# Patient Record
Sex: MALE | Race: AMERICAN INDIAN OR ALASKA NATIVE
[De-identification: names, ages, dates, MRNs, and addresses within clinical notes are randomized per-mention and may not be internally consistent; named-entity substitution may affect disease eponyms.]

---

## 2017-08-28 ENCOUNTER — HOSPITAL ENCOUNTER (EMERGENCY)
Dept: HOSPITAL 5 - ED | Age: 27
Discharge: HOME | End: 2017-08-28
Payer: SELF-PAY

## 2017-08-28 VITALS — DIASTOLIC BLOOD PRESSURE: 62 MMHG | SYSTOLIC BLOOD PRESSURE: 117 MMHG

## 2017-08-28 DIAGNOSIS — B34.9: Primary | ICD-10-CM

## 2017-08-28 DIAGNOSIS — F17.200: ICD-10-CM

## 2017-08-28 PROCEDURE — 99282 EMERGENCY DEPT VISIT SF MDM: CPT

## 2017-08-28 NOTE — EMERGENCY DEPARTMENT REPORT
ED General Adult HPI





- General


Chief complaint: Upper Respiratory Infection


Stated complaint: FLU SYMPTOMS


Time Seen by Provider: 08/28/17 10:55


Source: patient


Mode of arrival: Ambulatory


Limitations: No Limitations





- History of Present Illness


Initial comments: 





Patient is a 27-year-old male with no significant past medical history who 

presents with cough and myalgias and headaches that have been going on for the 

last 2 days.  Patient states that he feels like he has a viral syndrome.  He 

states that he's had some subjective fever.  He is not tried any over-the-

counter medications.  He states that his body pain is a 3 out of 10 all over 

his body is achy type of pain.  It is intermittent he states that coughing 

makes it worse and that nothing makes symptoms better. Pt states that he has 

been coughing brown sputum. 





- Related Data


 Previous Rx's











 Medication  Instructions  Recorded  Last Taken  Type


 


Guaifen/Phenyleph/Acetaminophn 1 each PO Q8HR PRN #30 tablet 08/28/17 Unknown Rx





[Tylenol Cold Head Congest Cplt]    


 


Ibuprofen [Motrin 400 MG tab] 400 mg PO Q8H PRN #30 tablet 08/28/17 Unknown Rx











 Allergies











Allergy/AdvReac Type Severity Reaction Status Date / Time


 


No Known Allergies Allergy   Unverified 08/28/17 09:37














ED Review of Systems


ROS: 


Stated complaint: FLU SYMPTOMS


Other details as noted in HPI





Constitutional: fever.  denies: chills


Eyes: denies: eye pain, eye discharge, vision change


ENT: denies: ear pain, throat pain


Respiratory: cough.  denies: shortness of breath, wheezing


Cardiovascular: denies: chest pain, palpitations


Endocrine: no symptoms reported


Gastrointestinal: denies: abdominal pain, nausea, diarrhea


Genitourinary: denies: urgency, dysuria


Musculoskeletal: myalgia.  denies: back pain, joint swelling, arthralgia


Skin: denies: rash, lesions


Neurological: denies: headache, weakness, paresthesias


Psychiatric: denies: anxiety, depression


Hematological/Lymphatic: denies: easy bleeding, easy bruising





ED Past Medical Hx





- Past Medical History


Previous Medical History?: No





- Surgical History


Past Surgical History?: No





- Social History


Smoking Status: Current Every Day Smoker


Substance Use Type: Other





- Medications


Home Medications: 


 Home Medications











 Medication  Instructions  Recorded  Confirmed  Last Taken  Type


 


Guaifen/Phenyleph/Acetaminophn 1 each PO Q8HR PRN #30 tablet 08/28/17  Unknown 

Rx





[Tylenol Cold Head Congest Cplt]     


 


Ibuprofen [Motrin 400 MG tab] 400 mg PO Q8H PRN #30 tablet 08/28/17  Unknown Rx














ED Physical Exam





- General


Limitations: No Limitations


General appearance: alert, in no apparent distress





- Head


Head exam: Present: atraumatic, normocephalic





- Eye


Eye exam: Present: normal appearance





- ENT


ENT exam: Present: mucous membranes moist





- Neck


Neck exam: Present: normal inspection





- Respiratory


Respiratory exam: Present: normal lung sounds bilaterally.  Absent: respiratory 

distress





- Cardiovascular


Cardiovascular Exam: Present: regular rate, normal rhythm.  Absent: systolic 

murmur, diastolic murmur, rubs, gallop





- GI/Abdominal


GI/Abdominal exam: Present: soft, normal bowel sounds





- Rectal


Rectal exam: Present: deferred





- Extremities Exam


Extremities exam: Present: normal inspection





- Back Exam


Back exam: Present: normal inspection





- Neurological Exam


Neurological exam: Present: alert, oriented X3





- Psychiatric


Psychiatric exam: Present: normal affect, normal mood





- Skin


Skin exam: Present: warm, dry, intact, normal color.  Absent: rash





ED Course


 Vital Signs











  08/28/17 08/28/17





  09:37 11:38


 


Temperature 98.9 F 


 


Pulse Rate 60 


 


Respiratory 18 18





Rate  


 


Blood Pressure 117/62 


 


O2 Sat by Pulse 97 





Oximetry  














ED Medical Decision Making





- Medical Decision Making





Chief medical diagnosis: Upper respiratory infection secondary to [place] virus


Differential multiple diagnosis: Sinusitis, influenza








I will give patient oral Tylenol and oral acetaminophen








The patient's clinical symptoms having a viral illness I will send patient home 

with rest and have him follow return precautions to come back to the emergency 

department.  Discussed plan with the patient he agrees plan.


Critical care attestation.: 


If time is entered above; I have spent that time in minutes in the direct care 

of this critically ill patient, excluding procedure time.








ED Disposition


Clinical Impression: 


 Viral syndrome





Disposition: DC-01 TO HOME OR SELFCARE


Is pt being admited?: No


Does the pt Need Aspirin: No


Condition: Stable


Prescriptions: 


Guaifen/Phenyleph/Acetaminophn [Tylenol Cold Head Congest Cplt] 1 each PO Q8HR 

PRN #30 tablet


 PRN Reason: Headache


Ibuprofen [Motrin 400 MG tab] 400 mg PO Q8H PRN #30 tablet


 PRN Reason: Pain


Referrals: 


PRIMARY CARE,MD [Primary Care Provider] - 3-5 Days


Forms:  Work/School Release Form(ED)


Time of Disposition: 11:30

## 2017-12-12 ENCOUNTER — HOSPITAL ENCOUNTER (EMERGENCY)
Dept: HOSPITAL 5 - ED | Age: 27
Discharge: HOME | End: 2017-12-12
Payer: COMMERCIAL

## 2017-12-12 VITALS — SYSTOLIC BLOOD PRESSURE: 118 MMHG | DIASTOLIC BLOOD PRESSURE: 68 MMHG

## 2017-12-12 DIAGNOSIS — J20.9: Primary | ICD-10-CM

## 2017-12-12 DIAGNOSIS — J06.9: ICD-10-CM

## 2017-12-12 DIAGNOSIS — F17.200: ICD-10-CM

## 2017-12-12 PROCEDURE — 71020: CPT

## 2017-12-12 PROCEDURE — 99283 EMERGENCY DEPT VISIT LOW MDM: CPT

## 2017-12-12 PROCEDURE — 87400 INFLUENZA A/B EACH AG IA: CPT

## 2017-12-12 NOTE — EMERGENCY DEPARTMENT REPORT
Chief Complaint: Upper Respiratory Infection


Stated Complaint: FLU LIKE SYMPTOMS





- HPI


History of Present Illness: 





27-year-old malesignificant past medical history presents with complaint of 

sore throat runny nose and cough for 4-5 days





- ROS


Review of Systems: 





Worsening URI symptoms 4-5 days





- Exam


Vital Signs: 


 Vital Signs











  12/12/17





  11:57


 


Temperature 98.3 F


 


Pulse Rate 64


 


Blood Pressure 118/68


 


O2 Sat by Pulse 96





Oximetry 











Physical Exam: 





Lungs clear to auscultation


MSE screening note: 


Focused history and physical exam performed.


Due to findings the following was ordered:





Screening Assessment/Plan/Differential Dx: URI versus flu


1- This initial assessment/diagnostic orders/clinical plan/ treatment(s) is/are 

subject to change based on pt's health status, clinical progression and re-

assessment by fellow clinical providers in the ED. Further treatment and workup 

at subsequent clinical provers discretion. Patient/guardians urged not to elope 

from ED as their condition may be serious if not clinically assessed and 

managed. 


2-flu swab sent, chest x-ray








ED Disposition for MSE


Condition: Stable

## 2017-12-12 NOTE — XRAY REPORT
ROUTINE CHEST, TWO VIEWS:



HISTORY:  Cough.



The trachea, heart, mediastinal contour, lung fields and bony thorax 

are unremarkable.



IMPRESSION:

Unremarkable chest x-ray.

## 2017-12-12 NOTE — EMERGENCY DEPARTMENT REPORT
ED General Adult HPI





- General


Chief complaint: Upper Respiratory Infection


Stated complaint: FLU LIKE SYMPTOMS


Time Seen by Provider: 12/12/17 13:21


Source: patient


Mode of arrival: Ambulatory


Limitations: No Limitations





- History of Present Illness


Initial comments: 


24 year old male presents with runny nose, cough, congestion, sore scratchy 

throat for about 4 days.  She states she has been taking over-the-counter Isis-

Bailey and Benadryl with minimal relief.  He said he is having substernal 

chest pain and pressure like headache that is not relieved denies fever, chills

, shortness of breath, wheezing, nausea, vomiting, diarrhea








- Related Data


 Previous Rx's











 Medication  Instructions  Recorded  Last Taken  Type


 


Guaifen/Phenyleph/Acetaminophn 1 each PO Q8HR PRN #30 tablet 08/28/17 Unknown Rx





[Tylenol Cold Head Congest Cplt]    


 


Ibuprofen [Motrin 400 MG tab] 400 mg PO Q8H PRN #30 tablet 08/28/17 Unknown Rx


 


ALBUTEROL Inhaler [ProAir HFA 2 puff IH QID PRN #1 inhalation 12/12/17 Unknown 

Rx





Inhaler]    


 


Azithromycin [Zithromax Z-BRENT] 250 mg PO QDAY #6 tablet 12/12/17 Unknown Rx


 


Prednisone [predniSONE 10 mg 10 mg PO .TAPER #1 tab.ds.pk 12/12/17 Unknown Rx





(6-Day Pack, 21 Tabs)]    











 Allergies











Allergy/AdvReac Type Severity Reaction Status Date / Time


 


No Known Allergies Allergy   Verified 12/12/17 11:56














ED Review of Systems


ROS: 


Stated complaint: FLU LIKE SYMPTOMS


Other details as noted in HPI





Constitutional: denies: chills, fever


Eyes: denies: eye pain, eye discharge, vision change


ENT: throat pain, congestion.  denies: ear pain


Respiratory: cough.  denies: shortness of breath, wheezing


Cardiovascular: denies: chest pain, palpitations


Endocrine: no symptoms reported


Gastrointestinal: denies: abdominal pain, nausea, diarrhea


Genitourinary: denies: urgency, dysuria


Musculoskeletal: denies: back pain, joint swelling, arthralgia


Skin: denies: rash, lesions


Neurological: denies: headache, weakness, paresthesias


Psychiatric: denies: anxiety, depression


Hematological/Lymphatic: denies: easy bleeding, easy bruising





ED Past Medical Hx





- Past Medical History


Previous Medical History?: No





- Surgical History


Past Surgical History?: No





- Social History


Smoking Status: Current Every Day Smoker


Substance Use Type: Other





- Medications


Home Medications: 


 Home Medications











 Medication  Instructions  Recorded  Confirmed  Last Taken  Type


 


Guaifen/Phenyleph/Acetaminophn 1 each PO Q8HR PRN #30 tablet 08/28/17  Unknown 

Rx





[Tylenol Cold Head Congest Cplt]     


 


Ibuprofen [Motrin 400 MG tab] 400 mg PO Q8H PRN #30 tablet 08/28/17  Unknown Rx


 


ALBUTEROL Inhaler [ProAir HFA 2 puff IH QID PRN #1 inhalation 12/12/17  Unknown 

Rx





Inhaler]     


 


Azithromycin [Zithromax Z-BRENT] 250 mg PO QDAY #6 tablet 12/12/17  Unknown Rx


 


Prednisone [predniSONE 10 mg 10 mg PO .TAPER #1 tab.ds.pk 12/12/17  Unknown Rx





(6-Day Pack, 21 Tabs)]     














ED Physical Exam





- General


Limitations: No Limitations


General appearance: alert, in no apparent distress





- Head


Head exam: Present: atraumatic, normocephalic





- Eye


Eye exam: Present: normal appearance





- ENT


ENT exam: Present: normal orophraynx, mucous membranes moist, TM's normal 

bilaterally





- Neck


Neck exam: Present: normal inspection, full ROM.  Absent: tenderness





- Respiratory


Respiratory exam: Present: normal lung sounds bilaterally.  Absent: respiratory 

distress, wheezes, rales, rhonchi, stridor





- Cardiovascular


Cardiovascular Exam: Present: regular rate, normal rhythm.  Absent: systolic 

murmur, diastolic murmur, rubs, gallop





- GI/Abdominal


GI/Abdominal exam: Present: soft, normal bowel sounds





- Rectal


Rectal exam: Present: deferred





- Extremities Exam


Extremities exam: Present: normal inspection





- Back Exam


Back exam: Present: normal inspection





- Neurological Exam


Neurological exam: Present: alert, oriented X3





- Psychiatric


Psychiatric exam: Present: normal affect, normal mood





- Skin


Skin exam: Present: warm, dry, intact, normal color.  Absent: rash





ED Course





 Vital Signs











  12/12/17





  11:57


 


Temperature 98.3 F


 


Pulse Rate 64


 


Blood Pressure 118/68


 


O2 Sat by Pulse 96





Oximetry 














ED Medical Decision Making





- Medical Decision Making





patient is resting comfortably at this time. VSS and NAD at this time. stable 

for DC.


Critical care attestation.: 


If time is entered above; I have spent that time in minutes in the direct care 

of this critically ill patient, excluding procedure time.








ED Disposition


Clinical Impression: 


 URI, acute, Acute bronchitis





Disposition: DC-01 TO HOME OR SELFCARE


Is pt being admited?: No


Does the pt Need Aspirin: No


Condition: Good


Instructions:  Acute Bronchitis (ED)


Prescriptions: 


ALBUTEROL Inhaler [ProAir HFA Inhaler] 2 puff IH QID PRN #1 inhalation


 PRN Reason: Shortness Of Breath


Azithromycin [Zithromax Z-BRENT] 250 mg PO QDAY #6 tablet


Prednisone [predniSONE 10 mg (6-Day Pack, 21 Tabs)] 10 mg PO .TAPER #1 tab.ds.pk


Referrals: 


ELLA VILLALBA MD [Staff Physician] - 3-5 Days


Forms:  Work/School Release Form(ED)


Time of Disposition: 13:34

## 2018-04-03 ENCOUNTER — HOSPITAL ENCOUNTER (EMERGENCY)
Dept: HOSPITAL 5 - ED | Age: 28
Discharge: LEFT BEFORE DISCHARGE | End: 2018-04-03
Payer: SELF-PAY

## 2018-04-03 VITALS — SYSTOLIC BLOOD PRESSURE: 124 MMHG | DIASTOLIC BLOOD PRESSURE: 80 MMHG

## 2018-04-03 DIAGNOSIS — F17.200: ICD-10-CM

## 2018-04-03 DIAGNOSIS — M67.432: Primary | ICD-10-CM

## 2018-04-03 DIAGNOSIS — Z88.8: ICD-10-CM

## 2018-04-03 PROCEDURE — 99283 EMERGENCY DEPT VISIT LOW MDM: CPT

## 2018-04-03 NOTE — EMERGENCY DEPARTMENT REPORT
ED Upper Extremity Inj HPI





- General


Chief Complaint: Extremity Injury, Upper


Stated Complaint: LEFT WRIST INJURY


Time Seen by Provider: 04/03/18 20:00


Source: patient


Mode of arrival: Ambulatory


Limitations: No Limitations





- History of Present Illness


Initial Comments: 





Patient is a 27-year-old -American male who is presenting with some 

swelling to the dorsum of his left hand.  Patient states there was no trauma 

there's been no fever no redness.  Patient said he had a small knot on the back 

of his hand and is now gone and this just some area of swelling.  Patient 

states feels like his hands tight.  Patient states the pain is a 3 out of 10 in 

severity.





- Related Data


 Previous Rx's











 Medication  Instructions  Recorded  Last Taken  Type


 


Guaifen/Phenyleph/Acetaminophn 1 each PO Q8HR PRN #30 tablet 08/28/17 Unknown Rx





[Tylenol Cold Head Congest Cplt]    


 


Ibuprofen [Motrin 400 MG tab] 400 mg PO Q8H PRN #30 tablet 08/28/17 Unknown Rx


 


ALBUTEROL Inhaler [ProAir HFA 2 puff IH QID PRN #1 inhalation 12/12/17 Unknown 

Rx





Inhaler]    


 


Azithromycin [Zithromax Z-BRENT] 250 mg PO QDAY #6 tablet 12/12/17 Unknown Rx


 


Prednisone [predniSONE 10 mg 10 mg PO .TAPER #1 tab.ds.pk 12/12/17 Unknown Rx





(6-Day Pack, 21 Tabs)]    











 Allergies











Allergy/AdvReac Type Severity Reaction Status Date / Time


 


prednisone Allergy  Shortness Verified 04/03/18 16:53





   of Breath  














ED Review of Systems


ROS: 


Stated complaint: LEFT WRIST INJURY


Other details as noted in HPI





Comment: All other systems reviewed and negative





ED Past Medical Hx





- Past Medical History


Previous Medical History?: No





- Surgical History


Past Surgical History?: No





- Social History


Smoking Status: Current Every Day Smoker


Substance Use Type: None





- Medications


Home Medications: 


 Home Medications











 Medication  Instructions  Recorded  Confirmed  Last Taken  Type


 


Guaifen/Phenyleph/Acetaminophn 1 each PO Q8HR PRN #30 tablet 08/28/17  Unknown 

Rx





[Tylenol Cold Head Congest Cplt]     


 


Ibuprofen [Motrin 400 MG tab] 400 mg PO Q8H PRN #30 tablet 08/28/17  Unknown Rx


 


ALBUTEROL Inhaler [ProAir HFA 2 puff IH QID PRN #1 inhalation 12/12/17  Unknown 

Rx





Inhaler]     


 


Azithromycin [Zithromax Z-BRENT] 250 mg PO QDAY #6 tablet 12/12/17  Unknown Rx


 


Prednisone [predniSONE 10 mg 10 mg PO .TAPER #1 tab.ds.pk 12/12/17  Unknown Rx





(6-Day Pack, 21 Tabs)]     














ED Physical Exam





- General


Limitations: No Limitations


General appearance: alert, in no apparent distress





- Head


Head exam: Present: atraumatic, normocephalic





- Eye


Eye exam: Present: normal appearance





- ENT


ENT exam: Present: mucous membranes moist





- Neck


Neck exam: Present: normal inspection





- Respiratory


Respiratory exam: Present: normal lung sounds bilaterally.  Absent: respiratory 

distress





- Cardiovascular


Cardiovascular Exam: Present: regular rate, normal rhythm.  Absent: systolic 

murmur, diastolic murmur, rubs, gallop





- GI/Abdominal


GI/Abdominal exam: Present: soft, normal bowel sounds





- Rectal


Rectal exam: Present: deferred





- Extremities Exam


Extremities exam: Present: normal inspection, other (mild swelling to the 

dorsum of the left hand just proximal to the second and third digit.  Patient 

has full range of motion.  Patient has no exquisite bony tenderness.)





- Back Exam


Back exam: Present: normal inspection





- Neurological Exam


Neurological exam: Present: alert, oriented X3





- Psychiatric


Psychiatric exam: Present: normal affect, normal mood





- Skin


Skin exam: Present: warm, dry, intact, normal color.  Absent: rash





ED Course





 Vital Signs











  04/03/18





  16:51


 


Temperature 98.2 F


 


Pulse Rate 68


 


Respiratory 18





Rate 


 


Blood Pressure 124/80


 


O2 Sat by Pulse 100





Oximetry 














ED Medical Decision Making





- Medical Decision Making





Patient most likely has a ganglion cyst that ruptured.  Patient been given 

advised that he should use Ace wrap and ice.  Patient is not medical emergency 

and is decided to take some subcutaneous simple medical advise him that we've 

given and followed [facility].


Critical care attestation.: 


If time is entered above; I have spent that time in minutes in the direct care 

of this critically ill patient, excluding procedure time.








ED Disposition


Clinical Impression: 


 Ganglion cyst of dorsum of left wrist





Disposition: Z-07 MED SCREENING EXAM-LEFT


Is pt being admited?: No


Does the pt Need Aspirin: No


Condition: Stable


Referrals: 


[facility] [Outside] - 3-5 Days

## 2018-04-03 NOTE — XRAY REPORT
FINAL REPORT



EXAM:  XR WRIST 3+V LT



HISTORY:  pain and swelling 



TECHNIQUE:  3 views of the left wrist



PRIORS:  None.



FINDINGS:  

There is no radiographic evidence of definite acute fracture or

dislocation.  No evidence of osseous lesion.  Joint spaces are

maintained.  There is no evidence of significant arthrosis.



IMPRESSION:  

No acute skeletal pathology

## 2022-03-28 ENCOUNTER — HOSPITAL ENCOUNTER (EMERGENCY)
Dept: HOSPITAL 5 - ED | Age: 32
Discharge: LEFT BEFORE DISCHARGE | End: 2022-03-28
Payer: SELF-PAY

## 2022-03-28 DIAGNOSIS — K62.5: Primary | ICD-10-CM

## 2022-03-28 DIAGNOSIS — Z53.21: ICD-10-CM
